# Patient Record
Sex: MALE | Race: OTHER
[De-identification: names, ages, dates, MRNs, and addresses within clinical notes are randomized per-mention and may not be internally consistent; named-entity substitution may affect disease eponyms.]

---

## 2019-01-12 ENCOUNTER — HOSPITAL ENCOUNTER (INPATIENT)
Dept: HOSPITAL 54 - ER | Age: 74
LOS: 3 days | Discharge: HOME | DRG: 377 | End: 2019-01-15
Attending: INTERNAL MEDICINE | Admitting: NURSE PRACTITIONER
Payer: MEDICARE

## 2019-01-12 VITALS — BODY MASS INDEX: 22.91 KG/M2 | HEIGHT: 65 IN | WEIGHT: 137.5 LBS

## 2019-01-12 VITALS — DIASTOLIC BLOOD PRESSURE: 85 MMHG | SYSTOLIC BLOOD PRESSURE: 106 MMHG

## 2019-01-12 VITALS — DIASTOLIC BLOOD PRESSURE: 59 MMHG | SYSTOLIC BLOOD PRESSURE: 109 MMHG

## 2019-01-12 VITALS — DIASTOLIC BLOOD PRESSURE: 65 MMHG | SYSTOLIC BLOOD PRESSURE: 108 MMHG

## 2019-01-12 VITALS — DIASTOLIC BLOOD PRESSURE: 63 MMHG | SYSTOLIC BLOOD PRESSURE: 111 MMHG

## 2019-01-12 VITALS — DIASTOLIC BLOOD PRESSURE: 54 MMHG | SYSTOLIC BLOOD PRESSURE: 99 MMHG

## 2019-01-12 DIAGNOSIS — N17.0: ICD-10-CM

## 2019-01-12 DIAGNOSIS — E86.0: ICD-10-CM

## 2019-01-12 DIAGNOSIS — K52.9: ICD-10-CM

## 2019-01-12 DIAGNOSIS — K62.1: ICD-10-CM

## 2019-01-12 DIAGNOSIS — E11.9: ICD-10-CM

## 2019-01-12 DIAGNOSIS — K29.71: Primary | ICD-10-CM

## 2019-01-12 DIAGNOSIS — I10: ICD-10-CM

## 2019-01-12 DIAGNOSIS — K44.9: ICD-10-CM

## 2019-01-12 DIAGNOSIS — D72.829: ICD-10-CM

## 2019-01-12 DIAGNOSIS — E87.2: ICD-10-CM

## 2019-01-12 DIAGNOSIS — K57.30: ICD-10-CM

## 2019-01-12 DIAGNOSIS — K64.8: ICD-10-CM

## 2019-01-12 DIAGNOSIS — E44.1: ICD-10-CM

## 2019-01-12 DIAGNOSIS — D62: ICD-10-CM

## 2019-01-12 LAB
ALBUMIN SERPL BCP-MCNC: 3.3 G/DL (ref 3.4–5)
ALP SERPL-CCNC: 97 U/L (ref 46–116)
ALT SERPL W P-5'-P-CCNC: 33 U/L (ref 12–78)
AST SERPL W P-5'-P-CCNC: 16 U/L (ref 15–37)
BASOPHILS # BLD AUTO: 0 /CMM (ref 0–0.2)
BASOPHILS NFR BLD AUTO: 0.1 % (ref 0–2)
BASOPHILS NFR BLD AUTO: 0.2 % (ref 0–2)
BASOPHILS NFR BLD AUTO: 0.3 % (ref 0–2)
BILIRUB DIRECT SERPL-MCNC: 0.3 MG/DL (ref 0–0.2)
BILIRUB SERPL-MCNC: 1 MG/DL (ref 0.2–1)
BUN SERPL-MCNC: 41 MG/DL (ref 7–18)
BUN SERPL-MCNC: 44 MG/DL (ref 7–18)
CALCIUM SERPL-MCNC: 8.1 MG/DL (ref 8.5–10.1)
CALCIUM SERPL-MCNC: 8.9 MG/DL (ref 8.5–10.1)
CHLORIDE SERPL-SCNC: 103 MMOL/L (ref 98–107)
CHLORIDE SERPL-SCNC: 107 MMOL/L (ref 98–107)
CHOLEST SERPL-MCNC: 87 MG/DL (ref ?–200)
CO2 SERPL-SCNC: 25 MMOL/L (ref 21–32)
CO2 SERPL-SCNC: 29 MMOL/L (ref 21–32)
CREAT SERPL-MCNC: 1.1 MG/DL (ref 0.6–1.3)
CREAT SERPL-MCNC: 1.5 MG/DL (ref 0.6–1.3)
EOSINOPHIL NFR BLD AUTO: 0 % (ref 0–6)
EOSINOPHIL NFR BLD AUTO: 0.2 % (ref 0–6)
EOSINOPHIL NFR BLD AUTO: 0.3 % (ref 0–6)
FERRITIN SERPL-MCNC: 422 NG/ML (ref 8–388)
GLUCOSE SERPL-MCNC: 196 MG/DL (ref 74–106)
GLUCOSE SERPL-MCNC: 260 MG/DL (ref 74–106)
HCT VFR BLD AUTO: 29 % (ref 39–51)
HCT VFR BLD AUTO: 34 % (ref 39–51)
HCT VFR BLD AUTO: 36 % (ref 39–51)
HDLC SERPL-MCNC: 42 MG/DL (ref 40–60)
HGB BLD-MCNC: 11.2 G/DL (ref 13.5–17.5)
HGB BLD-MCNC: 12.2 G/DL (ref 13.5–17.5)
HGB BLD-MCNC: 9.7 G/DL (ref 13.5–17.5)
IRON SERPL-MCNC: 60 UG/DL (ref 50–175)
LDLC SERPL DIRECT ASSAY-MCNC: 32 MG/DL (ref 0–99)
LIPASE SERPL-CCNC: 72 U/L (ref 73–393)
LYMPHOCYTES NFR BLD AUTO: 0.7 /CMM (ref 0.8–4.8)
LYMPHOCYTES NFR BLD AUTO: 0.9 /CMM (ref 0.8–4.8)
LYMPHOCYTES NFR BLD AUTO: 1.3 /CMM (ref 0.8–4.8)
LYMPHOCYTES NFR BLD AUTO: 20.8 % (ref 20–44)
LYMPHOCYTES NFR BLD AUTO: 6.4 % (ref 20–44)
LYMPHOCYTES NFR BLD AUTO: 7 % (ref 20–44)
LYMPHOCYTES NFR BLD MANUAL: 4 % (ref 16–48)
MAGNESIUM SERPL-MCNC: 2 MG/DL (ref 1.8–2.4)
MCHC RBC AUTO-ENTMCNC: 33 G/DL (ref 31–36)
MCHC RBC AUTO-ENTMCNC: 34 G/DL (ref 31–36)
MCHC RBC AUTO-ENTMCNC: 34 G/DL (ref 31–36)
MCV RBC AUTO: 91 FL (ref 80–96)
MONOCYTES NFR BLD AUTO: 0.4 /CMM (ref 0.1–1.3)
MONOCYTES NFR BLD AUTO: 0.5 /CMM (ref 0.1–1.3)
MONOCYTES NFR BLD AUTO: 0.5 /CMM (ref 0.1–1.3)
MONOCYTES NFR BLD AUTO: 3.4 % (ref 2–12)
MONOCYTES NFR BLD AUTO: 3.8 % (ref 2–12)
MONOCYTES NFR BLD AUTO: 8.3 % (ref 2–12)
MONOCYTES NFR BLD MANUAL: 2 % (ref 0–11)
NEUTROPHILS # BLD AUTO: 11.4 /CMM (ref 1.8–8.9)
NEUTROPHILS # BLD AUTO: 4.4 /CMM (ref 1.8–8.9)
NEUTROPHILS # BLD AUTO: 9.4 /CMM (ref 1.8–8.9)
NEUTROPHILS NFR BLD AUTO: 70.5 % (ref 43–81)
NEUTROPHILS NFR BLD AUTO: 88.6 % (ref 43–81)
NEUTROPHILS NFR BLD AUTO: 90.1 % (ref 43–81)
NEUTS BAND NFR BLD MANUAL: 1 % (ref 0–5)
NEUTS SEG NFR BLD MANUAL: 93 % (ref 42–76)
PHOSPHATE SERPL-MCNC: 2.4 MG/DL (ref 2.5–4.9)
PLATELET # BLD AUTO: 114 /CMM (ref 150–450)
PLATELET # BLD AUTO: 130 /CMM (ref 150–450)
PLATELET # BLD AUTO: 135 /CMM (ref 150–450)
POTASSIUM SERPL-SCNC: 4.2 MMOL/L (ref 3.5–5.1)
POTASSIUM SERPL-SCNC: 4.4 MMOL/L (ref 3.5–5.1)
PROT SERPL-MCNC: 6.4 G/DL (ref 6.4–8.2)
RBC # BLD AUTO: 3.16 MIL/UL (ref 4.5–6)
RBC # BLD AUTO: 3.67 MIL/UL (ref 4.5–6)
RBC # BLD AUTO: 3.98 MIL/UL (ref 4.5–6)
SODIUM SERPL-SCNC: 141 MMOL/L (ref 136–145)
SODIUM SERPL-SCNC: 141 MMOL/L (ref 136–145)
TIBC SERPL-MCNC: 235 UG/DL (ref 250–450)
TRIGL SERPL-MCNC: 74 MG/DL (ref 30–150)
TSH SERPL DL<=0.005 MIU/L-ACNC: 0.28 UIU/ML (ref 0.36–3.74)
WBC NRBC COR # BLD AUTO: 10.5 K/UL (ref 4.3–11)
WBC NRBC COR # BLD AUTO: 12.8 K/UL (ref 4.3–11)
WBC NRBC COR # BLD AUTO: 6.3 K/UL (ref 4.3–11)

## 2019-01-12 PROCEDURE — A9512 TC99M PERTECHNETATE: HCPCS

## 2019-01-12 PROCEDURE — G0378 HOSPITAL OBSERVATION PER HR: HCPCS

## 2019-01-12 PROCEDURE — A9560 TC99M LABELED RBC: HCPCS

## 2019-01-12 PROCEDURE — C9113 INJ PANTOPRAZOLE SODIUM, VIA: HCPCS

## 2019-01-12 PROCEDURE — A4216 STERILE WATER/SALINE, 10 ML: HCPCS

## 2019-01-12 RX ADMIN — SODIUM CHLORIDE PRN MLS/HR: 9 INJECTION, SOLUTION INTRAVENOUS at 20:48

## 2019-01-12 RX ADMIN — SODIUM CHLORIDE PRN MLS/HR: 9 INJECTION, SOLUTION INTRAVENOUS at 06:33

## 2019-01-12 RX ADMIN — SODIUM CHLORIDE SCH MG: 9 INJECTION, SOLUTION INTRAVENOUS at 17:30

## 2019-01-12 RX ADMIN — AMLODIPINE BESYLATE SCH MG: 5 TABLET ORAL at 17:35

## 2019-01-12 RX ADMIN — METOPROLOL TARTRATE SCH MG: 25 TABLET, FILM COATED ORAL at 17:35

## 2019-01-12 NOTE — NUR
RN NOTES 



RECEIVED PATIENT IN BED ALERT, AWAKE, ORIENTED X4 WITH BREATHING NORMAL, EVEN AND UNLABORED. 
NO SOB NOTED. NO ACUTE DISTRESS NOTED. TELE MONITOR REVEALS SR, HR=66. IV L HAND IS PATENT 
AND INTACT, RUNNING PROTONIX DRIP  AS PER ORDER. KEPT CLEAN,DRY AND INTACT. ALL NEEDS 
ATTENDED. SAFETY MEASURE OBSERVED. CALL LIGHT WITH IN REACH. WILL CONT TO MONITOR.

## 2019-01-12 NOTE — NUR
RN NOTES 



PATIENT ENDORSED TO NEXT SHIFT IN STABLE CONDITION  FOR CONTINUITY OF CARE. WILL CONT TO 
MONITOR.

## 2019-01-12 NOTE — NUR
RN INITIAL NOTES



RECEIVED PT IN BED FAMILY AT BED SIDE , A/OX4, ON ROOM AIR, NO SOB NOTED. PLACED ON 
TELEMONITOR, SR HR 68. NO PAIN OR DISCOMFORT IN THE ABDOMEN. SAFETY MEASURES IN PLACED. CALL 
LIGHT WITHIN EASY REACH. WILL CONT TO MONITOR.

## 2019-01-12 NOTE — NUR
PT BIB HIS DAUGHTER WITH A C/O VOMITTING BLOOD AND BLOODY STOOL. PT AMBULATED 
IN WITH A SLOW GAIT. PT WAS ASSISTED BY HIS DAUGHTER TO ER 5. PT WAS PLACED ON 
THE MONITOR AND CONTINUOS PULSE OX. DR. NICOLE IS AT THE BEDSIDE.

## 2019-01-12 NOTE — NUR
TELE/RN ADMITTING NOTES



RECEIVED PT FROM ER, A/OX4, ON ROOM AIR, NO SOB NOTED. PLACED ON TELEMONITOR, SR HR 70S. PT 
VERBALIZED DISCOMFORT IN THE ABDOMEN, WITH PAIN SCALE OF 2/10, PER PT PAIN IS TOLERABLE. VS 
AND ASSESSMENT DONE. WITH ONGOING PROTONIX DRIP 80MG/500ML NS AT 52 ML/HR INFUSING WELL ON 
(R) AC G18 IV. PROTONIX DRIP ADJUSTED TO 50 MLS/HR AS ORDERED. ORIENTED TO ROOM AND USE OF 
CALL LIGHT. SAFETY MEASURES IN PLACED. CALL LIGHT WITHIN EASY REACH. WILL CONT TO MONITOR



PER PT, PT REFUSED FLU VACCINES AS HE FEARS ADVERSE REACTION FROM PREVIOUS FLU VACCINES. PER 
PT, HE GOT PNEUMONIA VACCINE BUT UNABLE TO RECALL WHEN, PER PT WILL CHECK RECORDS AND FOLLOW 
UP

## 2019-01-13 VITALS — SYSTOLIC BLOOD PRESSURE: 115 MMHG | DIASTOLIC BLOOD PRESSURE: 60 MMHG

## 2019-01-13 VITALS — DIASTOLIC BLOOD PRESSURE: 63 MMHG | SYSTOLIC BLOOD PRESSURE: 108 MMHG

## 2019-01-13 VITALS — SYSTOLIC BLOOD PRESSURE: 113 MMHG | DIASTOLIC BLOOD PRESSURE: 60 MMHG

## 2019-01-13 VITALS — DIASTOLIC BLOOD PRESSURE: 57 MMHG | SYSTOLIC BLOOD PRESSURE: 106 MMHG

## 2019-01-13 VITALS — DIASTOLIC BLOOD PRESSURE: 57 MMHG | SYSTOLIC BLOOD PRESSURE: 110 MMHG

## 2019-01-13 VITALS — SYSTOLIC BLOOD PRESSURE: 112 MMHG | DIASTOLIC BLOOD PRESSURE: 57 MMHG

## 2019-01-13 VITALS — SYSTOLIC BLOOD PRESSURE: 109 MMHG | DIASTOLIC BLOOD PRESSURE: 57 MMHG

## 2019-01-13 LAB
BASOPHILS # BLD AUTO: 0 /CMM (ref 0–0.2)
BASOPHILS # BLD AUTO: 0 /CMM (ref 0–0.2)
BASOPHILS NFR BLD AUTO: 0.6 % (ref 0–2)
BASOPHILS NFR BLD AUTO: 0.7 % (ref 0–2)
BUN SERPL-MCNC: 21 MG/DL (ref 7–18)
CALCIUM SERPL-MCNC: 7.8 MG/DL (ref 8.5–10.1)
CHLORIDE SERPL-SCNC: 112 MMOL/L (ref 98–107)
CO2 SERPL-SCNC: 28 MMOL/L (ref 21–32)
CREAT SERPL-MCNC: 1 MG/DL (ref 0.6–1.3)
EOSINOPHIL NFR BLD AUTO: 1.2 % (ref 0–6)
EOSINOPHIL NFR BLD AUTO: 2.2 % (ref 0–6)
GLUCOSE SERPL-MCNC: 121 MG/DL (ref 74–106)
HCT VFR BLD AUTO: 24 % (ref 39–51)
HCT VFR BLD AUTO: 26 % (ref 39–51)
HEMOCCULT STL QL: POSITIVE
HGB BLD-MCNC: 8.3 G/DL (ref 13.5–17.5)
HGB BLD-MCNC: 8.7 G/DL (ref 13.5–17.5)
LYMPHOCYTES NFR BLD AUTO: 1.1 /CMM (ref 0.8–4.8)
LYMPHOCYTES NFR BLD AUTO: 1.2 /CMM (ref 0.8–4.8)
LYMPHOCYTES NFR BLD AUTO: 20.6 % (ref 20–44)
LYMPHOCYTES NFR BLD AUTO: 22.4 % (ref 20–44)
MAGNESIUM SERPL-MCNC: 1.9 MG/DL (ref 1.8–2.4)
MCHC RBC AUTO-ENTMCNC: 34 G/DL (ref 31–36)
MCHC RBC AUTO-ENTMCNC: 34 G/DL (ref 31–36)
MCV RBC AUTO: 91 FL (ref 80–96)
MCV RBC AUTO: 91 FL (ref 80–96)
MONOCYTES NFR BLD AUTO: 0.5 /CMM (ref 0.1–1.3)
MONOCYTES NFR BLD AUTO: 0.5 /CMM (ref 0.1–1.3)
MONOCYTES NFR BLD AUTO: 8.8 % (ref 2–12)
MONOCYTES NFR BLD AUTO: 9.2 % (ref 2–12)
NEUTROPHILS # BLD AUTO: 3.5 /CMM (ref 1.8–8.9)
NEUTROPHILS # BLD AUTO: 3.5 /CMM (ref 1.8–8.9)
NEUTROPHILS NFR BLD AUTO: 66.5 % (ref 43–81)
NEUTROPHILS NFR BLD AUTO: 67.8 % (ref 43–81)
PHOSPHATE SERPL-MCNC: 1.9 MG/DL (ref 2.5–4.9)
PLATELET # BLD AUTO: 107 /CMM (ref 150–450)
PLATELET # BLD AUTO: 99 /CMM (ref 150–450)
POTASSIUM SERPL-SCNC: 3.5 MMOL/L (ref 3.5–5.1)
RBC # BLD AUTO: 2.68 MIL/UL (ref 4.5–6)
RBC # BLD AUTO: 2.81 MIL/UL (ref 4.5–6)
SODIUM SERPL-SCNC: 146 MMOL/L (ref 136–145)
WBC NRBC COR # BLD AUTO: 5.1 K/UL (ref 4.3–11)
WBC NRBC COR # BLD AUTO: 5.3 K/UL (ref 4.3–11)

## 2019-01-13 RX ADMIN — METOPROLOL TARTRATE SCH MG: 25 TABLET, FILM COATED ORAL at 17:02

## 2019-01-13 RX ADMIN — SODIUM CHLORIDE SCH MG: 9 INJECTION, SOLUTION INTRAVENOUS at 17:02

## 2019-01-13 RX ADMIN — Medication SCH MLS/HR: at 22:13

## 2019-01-13 RX ADMIN — AMLODIPINE BESYLATE SCH MG: 5 TABLET ORAL at 09:21

## 2019-01-13 RX ADMIN — Medication SCH MLS/HR: at 15:08

## 2019-01-13 RX ADMIN — SODIUM PHOSPHATE, DIBASIC, ANHYDROUS, POTASSIUM PHOSPHATE, MONOBASIC, AND SODIUM PHOSPHATE, MONOBASIC, MONOHYDRATE SCH MG: 852; 155; 130 TABLET, COATED ORAL at 17:00

## 2019-01-13 RX ADMIN — SODIUM PHOSPHATE, DIBASIC, ANHYDROUS, POTASSIUM PHOSPHATE, MONOBASIC, AND SODIUM PHOSPHATE, MONOBASIC, MONOHYDRATE SCH MG: 852; 155; 130 TABLET, COATED ORAL at 22:13

## 2019-01-13 RX ADMIN — METOPROLOL TARTRATE SCH MG: 25 TABLET, FILM COATED ORAL at 09:21

## 2019-01-13 RX ADMIN — AMLODIPINE BESYLATE SCH MG: 5 TABLET ORAL at 17:02

## 2019-01-13 RX ADMIN — SODIUM CHLORIDE SCH MG: 9 INJECTION, SOLUTION INTRAVENOUS at 09:22

## 2019-01-13 NOTE — NUR
TELE1/RN

ASSUMED CARE FOR CONTINUITY OF CARE. PATIENT IS SLEEPING, APPEAR COMFORTABLE, BREATHING EVEN 
AND UNLABORED, IVF INFUSING WELL, CALL LIGHT IN REACH. WILL MONITOR.

## 2019-01-13 NOTE — NUR
RECIVED  PT IN BED BREATHING EVEN NO ACUTE DISTRESS NOTED , PT DENIES ANY  G I BLEED AT THIS 
TIME  VITAL SIGNS STABLE , DENIES PAIN  CLEAR LIQUID DIET GIVEN   TELE MONITOR SR

## 2019-01-13 NOTE — NUR
TELE1/RN

PATIENT IS AWAKE AT THIS TIME, BLOOD DRAW BEING DONE BY THE PHLEBOTOMIST, NO CHANGE IN 
CONDITION, ALL NEEDS ATTENDED AT THIS TIME, WILL CONTINUE TO MONITOR.

## 2019-01-13 NOTE — NUR
RN TRANSFER NOTES

PT ARRIVED ONTO THE UNIT VIA WHEELCHAIR ACCOMPANIED BY CHILDREN. PT ALERT AND ORIENTED. PT 
DUE FOR EGD/COLONOSCOPY TOMORROW. BOWEL PREP COMPLETE, WILL ASSESS BMS. SAFETY PRECAUTIONS 
IN PLACE, BED IN LOWEST LOCKED POSITION, X2 SIDE RAILS UP AND CALL LIGHT WITHIN REACH. WILL 
CONTINUE TO MONITOR.

## 2019-01-14 VITALS — SYSTOLIC BLOOD PRESSURE: 111 MMHG | DIASTOLIC BLOOD PRESSURE: 58 MMHG

## 2019-01-14 VITALS — SYSTOLIC BLOOD PRESSURE: 102 MMHG | DIASTOLIC BLOOD PRESSURE: 57 MMHG

## 2019-01-14 VITALS — DIASTOLIC BLOOD PRESSURE: 57 MMHG | SYSTOLIC BLOOD PRESSURE: 108 MMHG

## 2019-01-14 VITALS — DIASTOLIC BLOOD PRESSURE: 59 MMHG | SYSTOLIC BLOOD PRESSURE: 104 MMHG

## 2019-01-14 VITALS — SYSTOLIC BLOOD PRESSURE: 118 MMHG | DIASTOLIC BLOOD PRESSURE: 58 MMHG

## 2019-01-14 LAB
BASOPHILS # BLD AUTO: 0 /CMM (ref 0–0.2)
BASOPHILS NFR BLD AUTO: 0.7 % (ref 0–2)
BUN SERPL-MCNC: 9 MG/DL (ref 7–18)
CALCIUM SERPL-MCNC: 8 MG/DL (ref 8.5–10.1)
CHLORIDE SERPL-SCNC: 110 MMOL/L (ref 98–107)
CO2 SERPL-SCNC: 26 MMOL/L (ref 21–32)
CREAT SERPL-MCNC: 0.9 MG/DL (ref 0.6–1.3)
EOSINOPHIL NFR BLD AUTO: 2.5 % (ref 0–6)
GLUCOSE SERPL-MCNC: 105 MG/DL (ref 74–106)
HCT VFR BLD AUTO: 24 % (ref 39–51)
HGB BLD-MCNC: 8 G/DL (ref 13.5–17.5)
LYMPHOCYTES NFR BLD AUTO: 1.1 /CMM (ref 0.8–4.8)
LYMPHOCYTES NFR BLD AUTO: 31.3 % (ref 20–44)
MAGNESIUM SERPL-MCNC: 2 MG/DL (ref 1.8–2.4)
MCHC RBC AUTO-ENTMCNC: 34 G/DL (ref 31–36)
MCV RBC AUTO: 90 FL (ref 80–96)
MONOCYTES NFR BLD AUTO: 0.3 /CMM (ref 0.1–1.3)
MONOCYTES NFR BLD AUTO: 8.3 % (ref 2–12)
NEUTROPHILS # BLD AUTO: 2 /CMM (ref 1.8–8.9)
NEUTROPHILS NFR BLD AUTO: 57.2 % (ref 43–81)
PHOSPHATE SERPL-MCNC: 2.6 MG/DL (ref 2.5–4.9)
PLATELET # BLD AUTO: 100 /CMM (ref 150–450)
POTASSIUM SERPL-SCNC: 3.3 MMOL/L (ref 3.5–5.1)
RBC # BLD AUTO: 2.6 MIL/UL (ref 4.5–6)
SODIUM SERPL-SCNC: 144 MMOL/L (ref 136–145)
WBC NRBC COR # BLD AUTO: 3.6 K/UL (ref 4.3–11)

## 2019-01-14 PROCEDURE — 0DB78ZX EXCISION OF STOMACH, PYLORUS, VIA NATURAL OR ARTIFICIAL OPENING ENDOSCOPIC, DIAGNOSTIC: ICD-10-PCS

## 2019-01-14 PROCEDURE — 0DBP8ZZ EXCISION OF RECTUM, VIA NATURAL OR ARTIFICIAL OPENING ENDOSCOPIC: ICD-10-PCS

## 2019-01-14 RX ADMIN — SODIUM CHLORIDE SCH MG: 9 INJECTION, SOLUTION INTRAVENOUS at 17:15

## 2019-01-14 RX ADMIN — Medication SCH MLS/HR: at 15:23

## 2019-01-14 RX ADMIN — METOPROLOL TARTRATE SCH MG: 25 TABLET, FILM COATED ORAL at 09:00

## 2019-01-14 RX ADMIN — SODIUM CHLORIDE PRN MLS/HR: 9 INJECTION, SOLUTION INTRAVENOUS at 01:46

## 2019-01-14 RX ADMIN — Medication SCH MLS/HR: at 21:40

## 2019-01-14 RX ADMIN — Medication SCH MLS/HR: at 05:31

## 2019-01-14 RX ADMIN — AMLODIPINE BESYLATE SCH MG: 5 TABLET ORAL at 09:00

## 2019-01-14 RX ADMIN — SODIUM CHLORIDE SCH MG: 9 INJECTION, SOLUTION INTRAVENOUS at 09:41

## 2019-01-14 RX ADMIN — METOPROLOL TARTRATE SCH MG: 25 TABLET, FILM COATED ORAL at 17:00

## 2019-01-14 RX ADMIN — AMLODIPINE BESYLATE SCH MG: 5 TABLET ORAL at 17:00

## 2019-01-14 NOTE — NUR
MS RN INITIAL NOTES



RECEIVED PATIENT IN BED, WATCHING TV, ALERT, ORIENTED X 4. BREATHING EVEN AND UNLABORED. NOT 
IN ANY DISTRESS. NO COMPLAINTS OF PAIN OR DISCOMFORT AS OF THIS TIME. PERIPHERAL IV OF NS AT 
75ML/HR. SAFETY MEASURES IN PLACE: BED IN LOW, LOCKED POSITION. CALL BELL WITHIN REACH. 
PATIENT STABLE AS ENDORSED BY THE MORNING RN. WILL CONTINUE TO MONITOR ACCORDINGLY

## 2019-01-14 NOTE — NUR
RN MS NOTES

PT IN BED, ASLEEP, EASY TO AROUSE, ALERT AND ORIENTED, DENIES PAIN, RESPIRATIONS NORMAL, IV 
FLUIDS INFUSING WELL, CALL LIGHT WITHIN REACH, TOLERATING CURRENT DIET.

## 2019-01-14 NOTE — NUR
RN MS NOTES

PT AWAKE, NOT IN PAIN OR DISTRESS, PICKED UP BY O.R. STAFF FOR EGD/COLONOSCOPY, LEFT VIA BED 
IN STABLE CONDITION.

## 2019-01-14 NOTE — NUR
RN MS NOTES

PT IN BED, AWAKE, ALERT AND ORIENTED, NO COMPLAINT OF PAIN, RESPIRATIONS NORMAL, IV FLUIDS 
INFUSING WELL, NO N/V, CALL LIGHT WITHIN REACH, PLAN OF CARE DISCUSSED WITH PT, VERBALIZED 
UNDERSTANDING.

## 2019-01-14 NOTE — NUR
RN MS NOTES

PT IN BED, AWAKE, ALERT AND ORIENTED, NO COMPLAINT OF PAIN, NOT IN DISTRESS, NO EPISODE OF 
DARK STOOLS AT THIS TIME, TOLERATING CURRENT DIET WELL, IV FLUIDS INFUSING WELL, ALL NEEDS 
ATTENDED.

## 2019-01-14 NOTE — NUR
RN MS NOTES

PT BACK FROM SURGERY, AWAKE, ALERT AND ORIENTED, DENIES PAIN, NOT IN DISTRESS, VITAL SIGNS 
TAKEN AND RECORDED, POST OP ORDERS RECEIVED FROM MD, NOTED AND CARRIED OUT.

## 2019-01-14 NOTE — NUR
RN CLOSING NOTES

PT AWAKE AND RESTING IN BED. NO COMPLAINTS OF PAIN SOB OR DISTRESS. PT DUE FOR 
EGD/COLONOSCOPY TODAY. BOWEL PREP COMPLETE. CONSENTS IN CHART. PT HAS A RIGHT AC #18 INTACT 
AND RUNNING NS @75ML/HR. PT NPO SINCE MIDNIGHT. SAFETY PRECAUTIONS IN PLACE, BED IN LOWEST 
LOCKED POSITION, X2 SIDE RAILS UP AND CALL LIGHT WITHIN REACH. WILL ENDORSE TO DAY SHIFT 
NURSE FOR CONTINUITY OF CARE.

## 2019-01-15 VITALS — SYSTOLIC BLOOD PRESSURE: 103 MMHG | DIASTOLIC BLOOD PRESSURE: 61 MMHG

## 2019-01-15 VITALS — SYSTOLIC BLOOD PRESSURE: 133 MMHG | DIASTOLIC BLOOD PRESSURE: 67 MMHG

## 2019-01-15 VITALS — DIASTOLIC BLOOD PRESSURE: 61 MMHG | SYSTOLIC BLOOD PRESSURE: 103 MMHG

## 2019-01-15 LAB
BASOPHILS # BLD AUTO: 0 /CMM (ref 0–0.2)
BASOPHILS NFR BLD AUTO: 0.5 % (ref 0–2)
BUN SERPL-MCNC: 6 MG/DL (ref 7–18)
C-ANCA TITR SER IF: (no result) TITER
CALCIUM SERPL-MCNC: 7.9 MG/DL (ref 8.5–10.1)
CHLORIDE SERPL-SCNC: 109 MMOL/L (ref 98–107)
CO2 SERPL-SCNC: 27 MMOL/L (ref 21–32)
CREAT SERPL-MCNC: 0.9 MG/DL (ref 0.6–1.3)
EOSINOPHIL NFR BLD AUTO: 2.4 % (ref 0–6)
GLUCOSE SERPL-MCNC: 104 MG/DL (ref 74–106)
HCT VFR BLD AUTO: 24 % (ref 39–51)
HGB BLD-MCNC: 8.3 G/DL (ref 13.5–17.5)
LYMPHOCYTES NFR BLD AUTO: 1.1 /CMM (ref 0.8–4.8)
LYMPHOCYTES NFR BLD AUTO: 29.6 % (ref 20–44)
MCHC RBC AUTO-ENTMCNC: 34 G/DL (ref 31–36)
MCV RBC AUTO: 90 FL (ref 80–96)
MONOCYTES NFR BLD AUTO: 0.3 /CMM (ref 0.1–1.3)
MONOCYTES NFR BLD AUTO: 8.3 % (ref 2–12)
MYELOPEROXIDASE AB SER IA-ACNC: <9 U/ML (ref 0–9)
NEUTROPHILS # BLD AUTO: 2.2 /CMM (ref 1.8–8.9)
NEUTROPHILS NFR BLD AUTO: 59.2 % (ref 43–81)
P-ANCA ATYPICAL TITR SER IF: (no result) TITER
P-ANCA TITR SER IF: (no result) TITER
PLATELET # BLD AUTO: 117 /CMM (ref 150–450)
POTASSIUM SERPL-SCNC: 3 MMOL/L (ref 3.5–5.1)
PROTEINASE3 AB SER IA-ACNC: <3.5 U/ML (ref 0–3.5)
RBC # BLD AUTO: 2.68 MIL/UL (ref 4.5–6)
SODIUM SERPL-SCNC: 145 MMOL/L (ref 136–145)
WBC NRBC COR # BLD AUTO: 3.7 K/UL (ref 4.3–11)

## 2019-01-15 RX ADMIN — POTASSIUM CHLORIDE SCH MEQ: 1500 TABLET, EXTENDED RELEASE ORAL at 11:59

## 2019-01-15 RX ADMIN — Medication SCH MLS/HR: at 14:46

## 2019-01-15 RX ADMIN — POTASSIUM CHLORIDE SCH MEQ: 1500 TABLET, EXTENDED RELEASE ORAL at 16:22

## 2019-01-15 RX ADMIN — Medication SCH MLS/HR: at 05:40

## 2019-01-15 RX ADMIN — POTASSIUM CHLORIDE SCH MEQ: 1500 TABLET, EXTENDED RELEASE ORAL at 10:46

## 2019-01-15 RX ADMIN — SODIUM CHLORIDE SCH MG: 9 INJECTION, SOLUTION INTRAVENOUS at 16:21

## 2019-01-15 RX ADMIN — SODIUM CHLORIDE SCH MG: 9 INJECTION, SOLUTION INTRAVENOUS at 08:39

## 2019-01-15 RX ADMIN — METOPROLOL TARTRATE SCH MG: 25 TABLET, FILM COATED ORAL at 08:40

## 2019-01-15 RX ADMIN — AMLODIPINE BESYLATE SCH MG: 5 TABLET ORAL at 16:22

## 2019-01-15 RX ADMIN — AMLODIPINE BESYLATE SCH MG: 5 TABLET ORAL at 08:40

## 2019-01-15 RX ADMIN — METOPROLOL TARTRATE SCH MG: 25 TABLET, FILM COATED ORAL at 16:21

## 2019-01-15 NOTE — NUR
MS RN NOTES 



PATIENT IN BED RESTING NO SOB OR ACUTE DISTRESS NOTED. PATIENT ALERT, ORIENTED X4. DENIES 
ANY PAIN OR DISCOMFORT. BED IN LOW LOCKED POSITION. CALL LIGHT WITHIN REACH. WILL CONTINUE 
TO MONITOR.

## 2019-01-15 NOTE — NUR
MS RN CLOSING NOTES



PATIENT RESTING IN BED, ALERT AND ORIENTED X 4. BREATHING EVEN AND UNLABORED. NO COMPLAINTS 
OF PAIN OR DISCOMFORT AS OF THIS TIME. IV ON RAC G#18, S/L, INTACT AND PATENT. ALL NEEDS 
ATTENDED TO. ALL DUE MEDICATIONS GIVEN AS ORDERED. CALL BELL WITHIN REACH. BED IN LOW, 
LOCKED POSITION. WILL ENDORSE GORDON TO ONCOMING RN.

## 2019-01-15 NOTE — NUR
MS RN NOTES 



PATIENT DISCHARGED WITH DAUGHTER PATIENT ALERT, ORIENTED X3 DISCHARGE TEACHING INSTRUCTIONS 
PROVIDED TO PATIENT VERBALIZED UNDERSTANDING. ALL BELONGINGS ACCOUNTED FOR . BELONGING LIST 
SIGNED. PRESCRIPTION PROVIDED TO PATIENT. DISCHARGE PROTOCOL FOLLOWED. VERBALIZED 
UNDERSTANDING. PERIPHERAL IV REMOVED. ID BAND REMOVED. PATIENTS ESCORTED TO CAR .  

-------------------------------------------------------------------------------

Addendum: 01/15/19 at 1929 by VIKY MARADIAGA RN

-------------------------------------------------------------------------------

DR. LEA OFFICE PHONE NUMBER PROVIDED FOR FOLLOW  UP .